# Patient Record
Sex: MALE | Race: WHITE | NOT HISPANIC OR LATINO | Employment: FULL TIME | URBAN - METROPOLITAN AREA
[De-identification: names, ages, dates, MRNs, and addresses within clinical notes are randomized per-mention and may not be internally consistent; named-entity substitution may affect disease eponyms.]

---

## 2018-11-09 ENCOUNTER — APPOINTMENT (OUTPATIENT)
Dept: CT IMAGING | Facility: HOSPITAL | Age: 33
End: 2018-11-09

## 2018-11-09 ENCOUNTER — HOSPITAL ENCOUNTER (EMERGENCY)
Facility: HOSPITAL | Age: 33
Discharge: HOME | End: 2018-11-09
Attending: EMERGENCY MEDICINE | Admitting: EMERGENCY MEDICINE

## 2018-11-09 ENCOUNTER — DOCUMENTATION (OUTPATIENT)
Dept: SURGERY | Facility: HOSPITAL | Age: 33
End: 2018-11-09

## 2018-11-09 VITALS
SYSTOLIC BLOOD PRESSURE: 146 MMHG | WEIGHT: 240 LBS | HEART RATE: 68 BPM | BODY MASS INDEX: 30.8 KG/M2 | RESPIRATION RATE: 18 BRPM | DIASTOLIC BLOOD PRESSURE: 87 MMHG | HEIGHT: 74 IN | OXYGEN SATURATION: 97 % | TEMPERATURE: 97.5 F

## 2018-11-09 DIAGNOSIS — S16.2XXA: Primary | ICD-10-CM

## 2018-11-09 LAB
ANION GAP SERPL CALCULATED.3IONS-SCNC: 11.7 MMOL/L
BASOPHILS # BLD AUTO: 0.01 10*3/MM3 (ref 0–0.2)
BASOPHILS NFR BLD AUTO: 0.2 % (ref 0–1.5)
BUN BLD-MCNC: 12 MG/DL (ref 6–20)
BUN/CREAT SERPL: 14.6 (ref 7–25)
CALCIUM SPEC-SCNC: 9.6 MG/DL (ref 8.6–10.5)
CHLORIDE SERPL-SCNC: 105 MMOL/L (ref 98–107)
CO2 SERPL-SCNC: 25.3 MMOL/L (ref 22–29)
CREAT BLD-MCNC: 0.82 MG/DL (ref 0.76–1.27)
DEPRECATED RDW RBC AUTO: 43.6 FL (ref 37–54)
EOSINOPHIL # BLD AUTO: 0.03 10*3/MM3 (ref 0–0.7)
EOSINOPHIL NFR BLD AUTO: 0.5 % (ref 0.3–6.2)
ERYTHROCYTE [DISTWIDTH] IN BLOOD BY AUTOMATED COUNT: 12.9 % (ref 11.5–14.5)
GFR SERPL CREATININE-BSD FRML MDRD: 108 ML/MIN/1.73
GLUCOSE BLD-MCNC: 124 MG/DL (ref 65–99)
HCT VFR BLD AUTO: 46.3 % (ref 40.4–52.2)
HGB BLD-MCNC: 15.5 G/DL (ref 13.7–17.6)
IMM GRANULOCYTES # BLD: 0 10*3/MM3 (ref 0–0.03)
IMM GRANULOCYTES NFR BLD: 0 % (ref 0–0.5)
LYMPHOCYTES # BLD AUTO: 1.44 10*3/MM3 (ref 0.9–4.8)
LYMPHOCYTES NFR BLD AUTO: 22.1 % (ref 19.6–45.3)
MCH RBC QN AUTO: 30.9 PG (ref 27–32.7)
MCHC RBC AUTO-ENTMCNC: 33.5 G/DL (ref 32.6–36.4)
MCV RBC AUTO: 92.4 FL (ref 79.8–96.2)
MONOCYTES # BLD AUTO: 0.45 10*3/MM3 (ref 0.2–1.2)
MONOCYTES NFR BLD AUTO: 6.9 % (ref 5–12)
NEUTROPHILS # BLD AUTO: 4.59 10*3/MM3 (ref 1.9–8.1)
NEUTROPHILS NFR BLD AUTO: 70.3 % (ref 42.7–76)
PLATELET # BLD AUTO: 189 10*3/MM3 (ref 140–500)
PMV BLD AUTO: 9.9 FL (ref 6–12)
POTASSIUM BLD-SCNC: 3.8 MMOL/L (ref 3.5–5.2)
RBC # BLD AUTO: 5.01 10*6/MM3 (ref 4.6–6)
SODIUM BLD-SCNC: 142 MMOL/L (ref 136–145)
WBC NRBC COR # BLD: 6.52 10*3/MM3 (ref 4.5–10.7)

## 2018-11-09 PROCEDURE — 25010000002 IOPAMIDOL 61 % SOLUTION: Performed by: EMERGENCY MEDICINE

## 2018-11-09 PROCEDURE — 96366 THER/PROPH/DIAG IV INF ADDON: CPT

## 2018-11-09 PROCEDURE — 96365 THER/PROPH/DIAG IV INF INIT: CPT

## 2018-11-09 PROCEDURE — 85025 COMPLETE CBC W/AUTO DIFF WBC: CPT | Performed by: NURSE PRACTITIONER

## 2018-11-09 PROCEDURE — 70491 CT SOFT TISSUE NECK W/DYE: CPT

## 2018-11-09 PROCEDURE — 99284 EMERGENCY DEPT VISIT MOD MDM: CPT

## 2018-11-09 PROCEDURE — 80048 BASIC METABOLIC PNL TOTAL CA: CPT | Performed by: NURSE PRACTITIONER

## 2018-11-09 PROCEDURE — 25010000003 CEFAZOLIN IN DEXTROSE 2-4 GM/100ML-% SOLUTION: Performed by: NURSE PRACTITIONER

## 2018-11-09 RX ORDER — HYDROCODONE BITARTRATE AND ACETAMINOPHEN 5; 325 MG/1; MG/1
1 TABLET ORAL EVERY 6 HOURS PRN
Qty: 8 TABLET | Refills: 0 | Status: SHIPPED | OUTPATIENT
Start: 2018-11-09

## 2018-11-09 RX ORDER — BACITRACIN 50000 [IU]/1
50000 INJECTION, POWDER, FOR SOLUTION INTRAMUSCULAR ONCE
Status: COMPLETED | OUTPATIENT
Start: 2018-11-09 | End: 2018-11-09

## 2018-11-09 RX ORDER — CEFAZOLIN SODIUM 2 G/100ML
2 INJECTION, SOLUTION INTRAVENOUS ONCE
Status: COMPLETED | OUTPATIENT
Start: 2018-11-09 | End: 2018-11-09

## 2018-11-09 RX ORDER — IBUPROFEN 800 MG/1
800 TABLET ORAL EVERY 8 HOURS PRN
Qty: 30 TABLET | Refills: 0 | Status: SHIPPED | OUTPATIENT
Start: 2018-11-09

## 2018-11-09 RX ORDER — CEPHALEXIN 500 MG/1
500 CAPSULE ORAL 3 TIMES DAILY
Qty: 21 CAPSULE | Refills: 0 | Status: SHIPPED | OUTPATIENT
Start: 2018-11-09 | End: 2018-11-16

## 2018-11-09 RX ADMIN — BACITRACIN 50000 UNITS: 50000 INJECTION, POWDER, FOR SOLUTION INTRAMUSCULAR at 13:41

## 2018-11-09 RX ADMIN — IOPAMIDOL 75 ML: 612 INJECTION, SOLUTION INTRAVENOUS at 12:33

## 2018-11-09 RX ADMIN — CEFAZOLIN SODIUM 2 G: 2 INJECTION, SOLUTION INTRAVENOUS at 11:48

## 2018-11-09 NOTE — DISCHARGE INSTRUCTIONS
Return Precautions    Although you are being discharged from the ED today, I encourage you to return for worsening symptoms.  Things can, and do, change such that treatment at home with medication may not be adequate.      Specifically, return for any of the following:  swelling about the neck, feeling like you can't breath or wound problems.     Chest pain, shortness of breath, pain or nausea and vomiting not controlled by medications provided.    Please make a follow up with your Primary Care Provider for a blood pressure recheck.

## 2018-11-09 NOTE — ED PROVIDER NOTES
EMERGENCY DEPARTMENT ENCOUNTER    Room Number:  34/34  Date seen:  11/9/2018  Time seen: 11:07 AM  PCP: Provider, No Known    HPI:  Chief complaint:neck laceration  Context:Sai Oconnell is a 33 y.o. male who presents to the ED with c/o laceration to his anterior neck which occurred at work PTA.  Pt reports he was using a  on contaminated plumbing materials when it kicked back and cut his neck.  Pt c/o associated 'burning' pain, but denies numbness, difficulty swallowing, or SOA.  Pt denies SI.  Pt reports hx of smoking and alcohol use.    Timing:constant  Duration: PTA  Location:anterior neck  Radiation:none  Quality:laceration  Intensity/Severity:moderate  Associated Symptoms:associated 'burning' pain  Aggravating Factors:none stated  Alleviating Factors:none stated  Previous Episodes:none  Treatment before arrival:none      ALLERGIES  Patient has no known allergies.    PAST MEDICAL HISTORY  Active Ambulatory Problems     Diagnosis Date Noted   • No Active Ambulatory Problems     Resolved Ambulatory Problems     Diagnosis Date Noted   • No Resolved Ambulatory Problems     No Additional Past Medical History       PAST SURGICAL HISTORY  History reviewed. No pertinent surgical history.    FAMILY HISTORY  History reviewed. No pertinent family history.    SOCIAL HISTORY  Social History     Social History   • Marital status: Single     Spouse name: N/A   • Number of children: N/A   • Years of education: N/A     Occupational History   • Not on file.     Social History Main Topics   • Smoking status: Former Smoker   • Smokeless tobacco: Not on file   • Alcohol use Yes      Comment: daily   • Drug use: Unknown   • Sexual activity: Not on file     Other Topics Concern   • Not on file     Social History Narrative   • No narrative on file       REVIEW OF SYSTEMS  Review of Systems   Constitutional: Negative for chills and fever.   HENT: Negative.  Negative for trouble swallowing.    Eyes: Negative.     Respiratory: Negative for shortness of breath.    Cardiovascular: Negative for chest pain.   Gastrointestinal: Negative for abdominal pain.   Genitourinary: Negative.    Musculoskeletal: Positive for neck pain ('burning' associated with the neck laceration).   Skin: Positive for wound (laceration to anterior neck).   Neurological: Negative.  Negative for numbness.   Psychiatric/Behavioral: Negative.  Negative for suicidal ideas.       PHYSICAL EXAM  ED Triage Vitals   Temp Heart Rate Resp BP SpO2   11/09/18 1013 11/09/18 1013 11/09/18 1055 11/09/18 1055 11/09/18 1013   97.5 °F (36.4 °C) 101 16 (!) 147/102 98 %      Temp src Heart Rate Source Patient Position BP Location FiO2 (%)   11/09/18 1013 11/09/18 1013 -- -- --   Tympanic Monitor        Physical Exam   Constitutional: He is oriented to person, place, and time and well-developed, well-nourished, and in no distress.   HENT:   Head: Normocephalic and atraumatic.   Right Ear: External ear normal.   Left Ear: External ear normal.   Nose: Nose normal.   Mouth/Throat: Oropharynx is clear and moist.   Eyes: Conjunctivae are normal.   Neck: Normal range of motion. Neck supple.       Cardiovascular: Normal rate, regular rhythm and normal heart sounds.    Pulmonary/Chest: Effort normal and breath sounds normal.   Musculoskeletal: Normal range of motion.   Neurological: He is alert and oriented to person, place, and time.   Skin: Skin is warm and dry. Laceration (anterior neck has a 4cm linear laceration with visible defect in subq tissue and small defect in fascia) noted.   Psychiatric: Affect normal.   Nursing note and vitals reviewed.      LAB RESULTS  Recent Results (from the past 24 hour(s))   Basic Metabolic Panel    Collection Time: 11/09/18 11:39 AM   Result Value Ref Range    Glucose 124 (H) 65 - 99 mg/dL    BUN 12 6 - 20 mg/dL    Creatinine 0.82 0.76 - 1.27 mg/dL    Sodium 142 136 - 145 mmol/L    Potassium 3.8 3.5 - 5.2 mmol/L    Chloride 105 98 - 107 mmol/L     CO2 25.3 22.0 - 29.0 mmol/L    Calcium 9.6 8.6 - 10.5 mg/dL    eGFR Non African Amer 108 >60 mL/min/1.73    BUN/Creatinine Ratio 14.6 7.0 - 25.0    Anion Gap 11.7 mmol/L   CBC Auto Differential    Collection Time: 11/09/18 11:39 AM   Result Value Ref Range    WBC 6.52 4.50 - 10.70 10*3/mm3    RBC 5.01 4.60 - 6.00 10*6/mm3    Hemoglobin 15.5 13.7 - 17.6 g/dL    Hematocrit 46.3 40.4 - 52.2 %    MCV 92.4 79.8 - 96.2 fL    MCH 30.9 27.0 - 32.7 pg    MCHC 33.5 32.6 - 36.4 g/dL    RDW 12.9 11.5 - 14.5 %    RDW-SD 43.6 37.0 - 54.0 fl    MPV 9.9 6.0 - 12.0 fL    Platelets 189 140 - 500 10*3/mm3    Neutrophil % 70.3 42.7 - 76.0 %    Lymphocyte % 22.1 19.6 - 45.3 %    Monocyte % 6.9 5.0 - 12.0 %    Eosinophil % 0.5 0.3 - 6.2 %    Basophil % 0.2 0.0 - 1.5 %    Immature Grans % 0.0 0.0 - 0.5 %    Neutrophils, Absolute 4.59 1.90 - 8.10 10*3/mm3    Lymphocytes, Absolute 1.44 0.90 - 4.80 10*3/mm3    Monocytes, Absolute 0.45 0.20 - 1.20 10*3/mm3    Eosinophils, Absolute 0.03 0.00 - 0.70 10*3/mm3    Basophils, Absolute 0.01 0.00 - 0.20 10*3/mm3    Immature Grans, Absolute 0.00 0.00 - 0.03 10*3/mm3       I ordered the above labs and reviewed the results    RADIOLOGY  CT Soft Tissue Neck With Contrast    (Results Pending)   CT Soft Tissue Neck shows superficial injury.    I ordered the above noted radiological studies and reviewed the images on the PACS system.  Spoke with Dr. Sanchez regarding CT/MRI scan results    MEDICATIONS GIVEN IN ER  Medications   ceFAZolin in dextrose (ANCEF) IVPB solution 2 g (0 g Intravenous Stopped 11/9/18 1341)   bacitracin injection 50,000 Units (50,000 Units Irrigation Given by Other 11/9/18 1341)   iopamidol (ISOVUE-300) 61 % injection 100 mL (75 mL Intravenous Given 11/9/18 1233)       PROCEDURES  Procedures    COURSE & MEDICAL DECISION MAKING  Pertinent Labs and Imaging studies that were ordered and reviewed are noted above.  Results were reviewed/discussed with the patient and they were also made  aware of online access.  Pt also made aware that some labs, such as cultures, will not be resulted during ER visit and follow up with PMD is necessary.     PROGRESS AND CONSULTS    Progress Notes:    ED Course as of Nov 09 1346 Fri Nov 09, 2018   1057 Pt presents to ED c/o laceration to anterior neck that occurred PTA while he was using a .    Documentation assistance provided by christina Delacruz for Sharifa Nina PA-C.  Information recorded by the scribe was done at my direction and has been verified and validated by me.  [KA]      ED Course User Index  [KA] Irish Nina PA       1122: Reviewed pt's history and workup with Dr. Wang.  After a bedside evaluation, Dr. Wang agrees with the plan of care.    1124: Ordered CT Soft Tissue Neck with contrast for further evaluation of the wound extent, ordered BMP and CBC, and placed call to ENT.    1139: Discussed pt's case with Dr. Ford (ENT) who requests I consult plastic surgery.    1150: Discussed pt's case with Dr. Raymond (plastic surgery) who agrees to come see the pt in the ER.    1311: Rechecked pt, who is resting comfortably. Discussed Ct Soft Tissue Neck is unremarkable. Dr. Raymond (plastic surgery) is in the ER repairing pt's laceration at this time.  He states the pt can be seen in the office next week for f/u and suture removal. Plan for discharge with pain meds and abx. Pt understands and agrees with the plan, all questions answered.    1341: The patient's history, physical exam, and lab findings were discussed with the physician, who also performed a face to face history and physical exam.  I discussed all results and noted any abnormalities with patient.  Discussed absoute need to recheck abnormalities with their family physician.  I answered any of the patient's questions.  Discussed plan for discharge, as there is no emergent indication for admission.  Pt is agreeable and understands need for follow up and repeat testing.  Pt is  "aware that discharge does not mean that nothing is wrong but it indicates no emergency is present and they must continue care with their family physician.  Pt is discharged with instructions to follow up with primary care doctor to have their blood pressure rechecked.       Disposition vitals:  /87   Pulse 101   Temp 97.5 °F (36.4 °C) (Tympanic)   Resp 16   Ht 188 cm (74\")   Wt 109 kg (240 lb)   SpO2 97%   BMI 30.81 kg/m²       DIAGNOSIS  Final diagnoses:   Laceration of fascia of neck       FOLLOW UP   Benigno Raymond MD  [address] [facility] [address]  [phone]    Schedule an appointment as soon as possible for a visit   to be seen next Thursday or Friday for wound check and suture removal possible      RX     Medication List      New Prescriptions    cephalexin 500 MG capsule  Commonly known as:  KEFLEX  Take 1 capsule by mouth 3 (Three) Times a Day for 7 days.     HYDROcodone-acetaminophen 5-325 MG per tablet  Commonly known as:  NORCO  Take 1 tablet by mouth Every 6 (Six) Hours As Needed for Moderate Pain .     ibuprofen 800 MG tablet  Commonly known as:  ADVIL,MOTRIN  Take 1 tablet by mouth Every 8 (Eight) Hours As Needed for Mild Pain .          Maurilio Report  Maurilio report [id number] reviewed.   After examining the available clinical information and risks of prescribing controlled substances (including non-treatment or other adjunct treatments), it is considered medically appropriate that a controlled medication be prescribed.  I discussed risks/benefits/alternatives of using a controlled substance including risk of tolerance and dependence.  I discussed with patient (and family if applicable) that the patient should not drive, operate machinery, or otherwise engage in risky behavior as this medication may impair judgment and/or motor capabilities. I discussed that the patient will receive only a short-term controlled substance prescription for management of acute " symptoms and that any additional medication needs should be addressed by the primary care provider or appropriate specialist.    Documentation assistance provided by christina Castaneda for BETHEL Agudelo.  Information recorded by the christina was done at my direction and has been verified and validated by me.  Electronically signed by Radha Castaneda on 11/9/2018 at time 1:46 PM       Radha Castaneda  11/09/18 3310       Elsi Catalan APRN  11/09/18 [id number]

## 2018-11-09 NOTE — ED PROVIDER NOTES
Pt presents to the ED c/o anterior neck pain secondary to laceration. Pt advised he was using a  on old, dirty plumbing when it kicked back and cut him in front of his throat. He denies difficulty swallowing, SOA, numbness/tingling in BUE, and other injuries    PHYSICAL EXAM  GENERAL: not distressed  HENT: nares patent  EYES: EOMI, PERRL  NECK: laceration over the anterior neck starting at midline and extending over to the R. No active bleeding, nml carotids, no air bubbles noted during cleaning  CV: regular rhythm, regular rate  RESPIRATORY: normal effort  ABDOMEN: soft  MUSCULOSKELETAL: no deformity  NEURO: alert, oriented X 3  SKIN: warm, dry    Vital signs and nursing notes reviewed.    RADIOLOGY  I have reviewed the patient's imaging studies.    PROCEDURE    PROGRESS NOTES  1121  Spoke to midlevel provider BETHEL Agudelo, about the pt. After performing my own physical exam, I agree w/ the plan of care which including laceration repair and imaging. Pt told the plan to do imaging and d/c w/ abx.    DIAGNOSIS  Final diagnoses:   None         DISPOSITION  DISCHARGE    Patient discharged in stable condition.    Reviewed implications of results, diagnosis, meds, responsibility to follow up, warning signs and symptoms of possible worsening, potential complications and reasons to return to ER.    Patient/Family voiced understanding of above instructions.    Discussed plan for discharge, as there is no emergent indication for admission. Patient referred to primary care provider for BP management due to today's BP. Pt/family is agreeable and understands need for follow up and repeat testing.  Pt is aware that discharge does not mean that nothing is wrong but it indicates no emergency is present that requires admission and they must continue care with follow-up as given below or physician of their choice.     FOLLOW-UP  No follow-up provider specified.       Medication List      No changes were made to your  prescriptions during this visit.       Attestation:    The PALAK and I have discussed this patient's history, physical exam, and treatment plan.  I have reviewed the documentation and personally had a face to face interaction with the patient. I affirm the documentation and agree with the treatment and plan.  The attached note describes my personal findings.    Documentation assistance provided by christina Langley for Dr. Wang. Information recorded by the scribe was done at my direction and has been verified and validated by me.     Ronna Langley  11/09/18 1124       Leander Wang MD  11/09/18 [id number]

## 2018-11-09 NOTE — PROGRESS NOTES
Procedure   Procedures Date of Procedure November 9, 2018:    PREOPERATIVE DIAGNOSIS: Complex contaminated laceration of the back    POSTOPERATIVE DIAGNOSIS: 5.5 cm complex contaminated laceration of the neck    PROCEDURE: Debridement and layered repair of a contaminated 5.5 cm complex laceration of the neck.    SURGEON:  Benigno Raymond MD    ASSISTANT: 0    ANESTHESIA:  Local    COMPLICATIONS:  0    ESTIMATED BLOOD LOSS:  Multiple    INDICATION FOR PROCEDURE:  This 33-year-old  was using a  when he sustained a significant injury of his neck.  The patient was initially evaluated by the emergency room physician.  CT scan of the neck was unremarkable.  A plastic surgery consultation was requested for the debridement and repair of the complex laceration.  The potential risks and expected benefits of the operative procedure were discussed with the patient.    DESCRIPTION OF PROCEDURE:      The patient was placed in the supine position.  Local anesthesia was achieved by the subcutaneous infiltration of 1% lidocaine with epinephrine.  The neck was prepped with surgical soap, irrigated, and sterilely draped.    Examination of the wound revealed significant contamination with granular material.  An initial attempt was made to remove this mechanically and with irrigation.    The contaminated wound margins were sharply debrided with a #15 scalpel blade.  A 2 mm strip of contaminated tissue was removed from inside of the wound.    The platysmal muscle was repaired with 4-0 Vicryl suture.    The subcutaneous layer was closed with interrupted 5-0 Vicryl suture.    The cuticular layer was reapproximated with 6-0 black nylon suture.    Bacitracin and ointment was applied on the suture lines completion of the operative procedure.    Prior to discharge, the patient was provided with suture care instructions and prescriptions for an antibiotic and analgesic.